# Patient Record
Sex: MALE | URBAN - METROPOLITAN AREA
[De-identification: names, ages, dates, MRNs, and addresses within clinical notes are randomized per-mention and may not be internally consistent; named-entity substitution may affect disease eponyms.]

---

## 2019-07-17 ENCOUNTER — OFFICE VISIT (OUTPATIENT)
Dept: NEUROLOGY | Age: 29
End: 2019-07-17
Payer: COMMERCIAL

## 2019-07-17 ENCOUNTER — TELEPHONE (OUTPATIENT)
Dept: NEUROLOGY | Age: 29
End: 2019-07-17

## 2019-07-17 VITALS
HEART RATE: 72 BPM | DIASTOLIC BLOOD PRESSURE: 86 MMHG | HEIGHT: 69 IN | WEIGHT: 167 LBS | SYSTOLIC BLOOD PRESSURE: 130 MMHG | BODY MASS INDEX: 24.73 KG/M2

## 2019-07-17 DIAGNOSIS — F41.9 ANXIETY: ICD-10-CM

## 2019-07-17 DIAGNOSIS — F51.04 CHRONIC INSOMNIA: Primary | ICD-10-CM

## 2019-07-17 PROCEDURE — 99204 OFFICE O/P NEW MOD 45 MIN: CPT | Performed by: PSYCHIATRY & NEUROLOGY

## 2019-07-17 RX ORDER — ZOLPIDEM TARTRATE 5 MG/1
5 TABLET ORAL ONCE
Qty: 1 TABLET | Refills: 0 | Status: SHIPPED | OUTPATIENT
Start: 2019-07-17 | End: 2019-07-17

## 2019-07-17 NOTE — PROGRESS NOTES
regular basis. He has not had a sleep study in the past.        [place] Sleepiness Scale score today is 2/24. Sleep Habits:  Gets into bed at 11-12 AM falls asleep within 60min  Has 1 nocturnal awakenings, patient unsure why, unable to fall back asleep. Gets out of bed at 3. Weekend schedule same as weekday: yes   Naps: no    Sleep ROS:  AM headaches: no  Snoring: no  Witnessed apneas: no  Dry mouth: no  Nasal congestion:no  Excessive daytime sleepiness: no   Sleep Paralysis: no  Hypogogic/hypnopompic hallucinations:no  RLS symptoms:yes  Cataplexy:no  Insomnia:yes      Caffeine intake: none  Patient sleeps in bed:yes   Smoker: no   Drinks alcoholic beverages: occasional      Prior workup reviewed:  Ferritin: 126      PAST MEDICAL HISTORY:   History reviewed. No pertinent past medical history. PAST SURGICAL HISTORY:   History reviewed. No pertinent surgical history.      SOCIAL HISTORY:     Social History     Socioeconomic History    Marital status: Single     Spouse name: Not on file    Number of children: Not on file    Years of education: Not on file    Highest education level: Not on file   Occupational History    Not on file   Social Needs    Financial resource strain: Not on file    Food insecurity:     Worry: Not on file     Inability: Not on file    Transportation needs:     Medical: Not on file     Non-medical: Not on file   Tobacco Use    Smoking status: Never Smoker    Smokeless tobacco: Never Used   Substance and Sexual Activity    Alcohol use: Yes     Comment: occasionally    Drug use: Yes     Types: Marijuana     Comment: occasionally    Sexual activity: Not on file   Lifestyle    Physical activity:     Days per week: Not on file     Minutes per session: Not on file    Stress: Not on file   Relationships    Social connections:     Talks on phone: Not on file     Gets together: Not on file     Attends Gnosticist service: Not on file     Active member of club or organization: Not atrophy or fasciculation      Motor function  Normal muscle bulk and tone; strength 5/5 on all 4 extremities, no pronator drift      Sensory function Intact to light touch, pinprick, vibration, proprioception on all 4 extremities      Cerebellar Intact fine motor movement. No involuntary movements or tremors. No ataxia or dysmetria on finger to nose or heel to shin testing      Reflex function DTR 2+ on bilateral UE and LE, symmetric. Negative Babinski      Gait                   normal base and arm swing            ASSESSMENT:   Acute insomnia        PLAN:     This is a 33 y.o. male who presents with a 4-month history of insomnia, with no clear trigger. He has trouble with both sleep onset and maintenance, remains in bed for hours. Of note, he also reports a \"buzzing sensation\" all over his body, recent ferritin level was within normal limits. I had a long discussion with the patient about improving his sleep hygiene, including establishing a relaxing bedtime routine 15 to 30 minutes prior to bedtime. Also instructed patient on stimulus control and avoiding extended wakefulness in bed. Does not drink any caffeine, advised patient against alcohol as this can actually worsen his symptoms and cause more sleep disruption particularly in the latter half of the night. We will also proceed with a sleep study to see if periodic limb movements may be causing his sleep disruption. He has a history of anxiety and depression, and this may be playing into his symptoms as well. If his sleep study does not show any significant abnormalities, we will then consider a therapeutic trial of medication if behavioral interventions do not help. Follow-up after sleep study. Signed: Mally Francis MD  Neurology and Sleep Medicine  [facility].    Please note that this chart was generated using voice recognition Dragon dictation software.   Although every effort was made to ensure the accuracy of